# Patient Record
Sex: MALE | Race: WHITE | URBAN - METROPOLITAN AREA
[De-identification: names, ages, dates, MRNs, and addresses within clinical notes are randomized per-mention and may not be internally consistent; named-entity substitution may affect disease eponyms.]

---

## 2019-06-11 ENCOUNTER — HISTORICAL (OUTPATIENT)
Dept: ADMINISTRATIVE | Facility: HOSPITAL | Age: 64
End: 2019-06-11

## 2019-06-11 LAB
BNP BLD-MCNC: 70 PG/ML (ref 0–125)
BUN SERPL-MCNC: 18 MG/DL (ref 7–18)
CALCIUM SERPL-MCNC: 9.5 MG/DL (ref 8.5–10.1)
CHLORIDE SERPL-SCNC: 101 MMOL/L (ref 98–107)
CO2 SERPL-SCNC: 31 MMOL/L (ref 21–32)
CREAT SERPL-MCNC: 1.25 MG/DL (ref 0.7–1.3)
CREAT/UREA NIT SERPL: 14.4
GLUCOSE SERPL-MCNC: 92 MG/DL (ref 74–106)
POTASSIUM SERPL-SCNC: 4.5 MMOL/L (ref 3.5–5.1)
SODIUM SERPL-SCNC: 136 MMOL/L (ref 136–145)

## 2019-06-16 ENCOUNTER — HOSPITAL ENCOUNTER (OUTPATIENT)
Dept: MEDSURG UNIT | Facility: HOSPITAL | Age: 64
End: 2019-06-18
Attending: INTERNAL MEDICINE | Admitting: INTERNAL MEDICINE

## 2019-06-16 LAB
ABS NEUT (OLG): 4.28 X10(3)/MCL (ref 2.1–9.2)
ALBUMIN SERPL-MCNC: 3.9 GM/DL (ref 3.4–5)
ALBUMIN/GLOB SERPL: 1.2 RATIO (ref 1.1–2)
ALP SERPL-CCNC: 106 UNIT/L (ref 50–136)
ALT SERPL-CCNC: 35 UNIT/L (ref 12–78)
AST SERPL-CCNC: 24 UNIT/L (ref 15–37)
BASOPHILS # BLD AUTO: 0.1 X10(3)/MCL (ref 0–0.2)
BASOPHILS NFR BLD AUTO: 1 %
BILIRUB SERPL-MCNC: 0.7 MG/DL (ref 0.2–1)
BILIRUBIN DIRECT+TOT PNL SERPL-MCNC: 0.2 MG/DL (ref 0–0.5)
BILIRUBIN DIRECT+TOT PNL SERPL-MCNC: 0.5 MG/DL (ref 0–0.8)
BNP BLD-MCNC: 115 PG/ML (ref 0–100)
BUN SERPL-MCNC: 20 MG/DL (ref 7–18)
CALCIUM SERPL-MCNC: 9.5 MG/DL (ref 8.5–10.1)
CHLORIDE SERPL-SCNC: 105 MMOL/L (ref 98–107)
CO2 SERPL-SCNC: 30 MMOL/L (ref 21–32)
CREAT SERPL-MCNC: 1.42 MG/DL (ref 0.7–1.3)
EOSINOPHIL # BLD AUTO: 0.2 X10(3)/MCL (ref 0–0.9)
EOSINOPHIL NFR BLD AUTO: 2 %
ERYTHROCYTE [DISTWIDTH] IN BLOOD BY AUTOMATED COUNT: 11.9 % (ref 11.5–17)
GLOBULIN SER-MCNC: 3.3 GM/DL (ref 2.4–3.5)
GLUCOSE SERPL-MCNC: 99 MG/DL (ref 74–106)
HCT VFR BLD AUTO: 52.9 % (ref 42–52)
HGB BLD-MCNC: 17.6 GM/DL (ref 14–18)
INR PPP: 1.2 (ref 0–1.3)
LACTATE SERPL-SCNC: 1.8 MMOL/L (ref 0.4–2)
LACTATE SERPL-SCNC: 2.6 MMOL/L (ref 0.4–2)
LYMPHOCYTES # BLD AUTO: 4.7 X10(3)/MCL (ref 0.6–4.6)
LYMPHOCYTES NFR BLD AUTO: 44 %
MAGNESIUM SERPL-MCNC: 2.6 MG/DL (ref 1.8–2.4)
MCH RBC QN AUTO: 30.1 PG (ref 27–31)
MCHC RBC AUTO-ENTMCNC: 33.3 GM/DL (ref 33–36)
MCV RBC AUTO: 90.4 FL (ref 80–94)
MONOCYTES # BLD AUTO: 1.4 X10(3)/MCL (ref 0.1–1.3)
MONOCYTES NFR BLD AUTO: 13 %
NEUTROPHILS # BLD AUTO: 4.28 X10(3)/MCL (ref 2.1–9.2)
NEUTROPHILS NFR BLD AUTO: 40 %
PLATELET # BLD AUTO: 318 X10(3)/MCL (ref 130–400)
PMV BLD AUTO: 10.1 FL (ref 9.4–12.4)
POC TROPONIN: 0.05 NG/ML (ref 0–0.08)
POTASSIUM SERPL-SCNC: 3.8 MMOL/L (ref 3.5–5.1)
PROT SERPL-MCNC: 7.2 GM/DL (ref 6.4–8.2)
PROTHROMBIN TIME: 15.5 SECOND(S) (ref 12–14)
RBC # BLD AUTO: 5.85 X10(6)/MCL (ref 4.7–6.1)
SODIUM SERPL-SCNC: 141 MMOL/L (ref 136–145)
WBC # SPEC AUTO: 10.7 X10(3)/MCL (ref 4.5–11.5)

## 2019-06-17 LAB
TROPONIN I SERPL-MCNC: 0.04 NG/ML (ref 0.02–0.49)
TROPONIN I SERPL-MCNC: 0.05 NG/ML (ref 0.02–0.49)

## 2019-06-20 ENCOUNTER — HISTORICAL (OUTPATIENT)
Dept: CARDIAC REHAB | Facility: HOSPITAL | Age: 64
End: 2019-06-20

## 2019-06-21 ENCOUNTER — HISTORICAL (OUTPATIENT)
Dept: CARDIAC REHAB | Facility: HOSPITAL | Age: 64
End: 2019-06-21

## 2019-06-24 ENCOUNTER — HISTORICAL (OUTPATIENT)
Dept: CARDIAC REHAB | Facility: HOSPITAL | Age: 64
End: 2019-06-24

## 2019-06-26 ENCOUNTER — HISTORICAL (OUTPATIENT)
Dept: CARDIAC REHAB | Facility: HOSPITAL | Age: 64
End: 2019-06-26

## 2019-06-28 ENCOUNTER — HISTORICAL (OUTPATIENT)
Dept: CARDIAC REHAB | Facility: HOSPITAL | Age: 64
End: 2019-06-28

## 2019-07-01 ENCOUNTER — HISTORICAL (OUTPATIENT)
Dept: CARDIAC REHAB | Facility: HOSPITAL | Age: 64
End: 2019-07-01

## 2019-07-03 ENCOUNTER — HISTORICAL (OUTPATIENT)
Dept: CARDIAC REHAB | Facility: HOSPITAL | Age: 64
End: 2019-07-03

## 2019-07-05 ENCOUNTER — HISTORICAL (OUTPATIENT)
Dept: CARDIAC REHAB | Facility: HOSPITAL | Age: 64
End: 2019-07-05

## 2019-07-08 ENCOUNTER — HISTORICAL (OUTPATIENT)
Dept: CARDIAC REHAB | Facility: HOSPITAL | Age: 64
End: 2019-07-08

## 2019-07-09 ENCOUNTER — HISTORICAL (OUTPATIENT)
Dept: ADMINISTRATIVE | Facility: HOSPITAL | Age: 64
End: 2019-07-09

## 2019-07-09 LAB — DIGOXIN SERPL-MCNC: 1.3 NG/ML (ref 0.9–2)

## 2019-07-10 ENCOUNTER — HISTORICAL (OUTPATIENT)
Dept: CARDIAC REHAB | Facility: HOSPITAL | Age: 64
End: 2019-07-10

## 2019-07-12 ENCOUNTER — HISTORICAL (OUTPATIENT)
Dept: CARDIAC REHAB | Facility: HOSPITAL | Age: 64
End: 2019-07-12

## 2019-07-15 ENCOUNTER — HISTORICAL (OUTPATIENT)
Dept: CARDIAC REHAB | Facility: HOSPITAL | Age: 64
End: 2019-07-15

## 2019-07-17 ENCOUNTER — HISTORICAL (OUTPATIENT)
Dept: CARDIAC REHAB | Facility: HOSPITAL | Age: 64
End: 2019-07-17

## 2019-07-19 ENCOUNTER — HISTORICAL (OUTPATIENT)
Dept: CARDIAC REHAB | Facility: HOSPITAL | Age: 64
End: 2019-07-19

## 2019-07-22 ENCOUNTER — HISTORICAL (OUTPATIENT)
Dept: CARDIAC REHAB | Facility: HOSPITAL | Age: 64
End: 2019-07-22

## 2019-07-24 ENCOUNTER — HISTORICAL (OUTPATIENT)
Dept: CARDIAC REHAB | Facility: HOSPITAL | Age: 64
End: 2019-07-24

## 2019-07-26 ENCOUNTER — HISTORICAL (OUTPATIENT)
Dept: CARDIAC REHAB | Facility: HOSPITAL | Age: 64
End: 2019-07-26

## 2019-07-29 ENCOUNTER — HISTORICAL (OUTPATIENT)
Dept: CARDIAC REHAB | Facility: HOSPITAL | Age: 64
End: 2019-07-29

## 2019-07-31 ENCOUNTER — HISTORICAL (OUTPATIENT)
Dept: CARDIAC REHAB | Facility: HOSPITAL | Age: 64
End: 2019-07-31

## 2019-08-02 ENCOUNTER — HISTORICAL (OUTPATIENT)
Dept: CARDIAC REHAB | Facility: HOSPITAL | Age: 64
End: 2019-08-02

## 2019-08-05 ENCOUNTER — HISTORICAL (OUTPATIENT)
Dept: CARDIAC REHAB | Facility: HOSPITAL | Age: 64
End: 2019-08-05

## 2019-08-06 ENCOUNTER — HISTORICAL (OUTPATIENT)
Dept: ADMINISTRATIVE | Facility: HOSPITAL | Age: 64
End: 2019-08-06

## 2019-08-06 LAB — DIGOXIN SERPL-MCNC: 1.3 NG/ML (ref 0.9–2)

## 2019-08-07 ENCOUNTER — HISTORICAL (OUTPATIENT)
Dept: CARDIAC REHAB | Facility: HOSPITAL | Age: 64
End: 2019-08-07

## 2019-08-09 ENCOUNTER — HISTORICAL (OUTPATIENT)
Dept: CARDIAC REHAB | Facility: HOSPITAL | Age: 64
End: 2019-08-09

## 2019-08-12 ENCOUNTER — HISTORICAL (OUTPATIENT)
Dept: CARDIAC REHAB | Facility: HOSPITAL | Age: 64
End: 2019-08-12

## 2019-09-09 ENCOUNTER — HISTORICAL (OUTPATIENT)
Dept: INTENSIVE CARE | Facility: HOSPITAL | Age: 64
End: 2019-09-09

## 2019-10-02 ENCOUNTER — HISTORICAL (OUTPATIENT)
Dept: ADMINISTRATIVE | Facility: HOSPITAL | Age: 64
End: 2019-10-02

## 2019-11-19 ENCOUNTER — HISTORICAL (OUTPATIENT)
Dept: ADMINISTRATIVE | Facility: HOSPITAL | Age: 64
End: 2019-11-19

## 2019-11-19 LAB
ABS NEUT (OLG): 4.02 X10(3)/MCL (ref 2.1–9.2)
BASOPHILS # BLD AUTO: 0.1 X10(3)/MCL (ref 0–0.2)
BASOPHILS NFR BLD AUTO: 1 %
BUN SERPL-MCNC: 16 MG/DL (ref 7–18)
CALCIUM SERPL-MCNC: 9.1 MG/DL (ref 8.5–10.1)
CHLORIDE SERPL-SCNC: 104 MMOL/L (ref 98–107)
CO2 SERPL-SCNC: 29 MMOL/L (ref 21–32)
CREAT SERPL-MCNC: 1.15 MG/DL (ref 0.7–1.3)
CREAT/UREA NIT SERPL: 13.9
EOSINOPHIL # BLD AUTO: 0.2 X10(3)/MCL (ref 0–0.9)
EOSINOPHIL NFR BLD AUTO: 3 %
ERYTHROCYTE [DISTWIDTH] IN BLOOD BY AUTOMATED COUNT: 12.5 % (ref 11.5–17)
GLUCOSE SERPL-MCNC: 105 MG/DL (ref 74–106)
HCT VFR BLD AUTO: 51.8 % (ref 42–52)
HGB BLD-MCNC: 17.1 GM/DL (ref 14–18)
LYMPHOCYTES # BLD AUTO: 1.9 X10(3)/MCL (ref 0.6–4.6)
LYMPHOCYTES NFR BLD AUTO: 27 %
MCH RBC QN AUTO: 29.5 PG (ref 27–31)
MCHC RBC AUTO-ENTMCNC: 33 GM/DL (ref 33–36)
MCV RBC AUTO: 89.3 FL (ref 80–94)
MONOCYTES # BLD AUTO: 0.8 X10(3)/MCL (ref 0.1–1.3)
MONOCYTES NFR BLD AUTO: 11 %
NEUTROPHILS # BLD AUTO: 4.02 X10(3)/MCL (ref 2.1–9.2)
NEUTROPHILS NFR BLD AUTO: 58 %
PLATELET # BLD AUTO: 215 X10(3)/MCL (ref 130–400)
PMV BLD AUTO: 9.7 FL (ref 9.4–12.4)
POTASSIUM SERPL-SCNC: 4.6 MMOL/L (ref 3.5–5.1)
RBC # BLD AUTO: 5.8 X10(6)/MCL (ref 4.7–6.1)
SODIUM SERPL-SCNC: 138 MMOL/L (ref 136–145)
WBC # SPEC AUTO: 7 X10(3)/MCL (ref 4.5–11.5)

## 2022-04-10 ENCOUNTER — HISTORICAL (OUTPATIENT)
Dept: ADMINISTRATIVE | Facility: HOSPITAL | Age: 67
End: 2022-04-10

## 2022-04-28 VITALS
SYSTOLIC BLOOD PRESSURE: 120 MMHG | WEIGHT: 223 LBS | BODY MASS INDEX: 33.03 KG/M2 | HEIGHT: 69 IN | DIASTOLIC BLOOD PRESSURE: 88 MMHG

## 2022-04-30 NOTE — H&P
"  CHIEF COMPLAINT:  "Passed out."    HISTORY OF PRESENT ILLNESS:  The patient is a 64-year-old white male, well known to me.  The patient experienced a myocardial infarction 2019, thought to represent an embolic event associated with atrial fibrillation.  The patient had problems with hypertension during that hospital stay and required high-dose enalapril for control.  He states he was in his automobile on the way to [place].  His wife was driving.  She states he suddenly became unresponsive and slumped over.  She placed a nitroglycerin under the patient's tongue which he then spat out.  At the time of presentation to the ER, he was hypotensive.  He denied associated chest discomfort or shortness of breath and stated he has felt fine since that event.  EKG and cardiac enzymes were unremarkable.    PAST MEDICAL HISTORY:  Significant for recent non-ST-elevation myocardial infarction associated with rapid atrial fibrillation and 3 to 4+ mitral regurgitation.  He has also been treated for hyperlipidemia.    CURRENT MEDICATIONS:  Include:    1. Metoprolol ER 50 mg p.o. b.i.d.  2. Eliquis 5 mg p.o. b.i.d.  3. Aspirin 81 mg daily.  4. Atorvastatin 10 mg at bedtime.   5. Enalapril 20 mg b.i.d.  6. Hydrochlorothiazide 25 mg daily.    ALLERGIES:  HE REPORTS NO ALLERGIES.    SOCIAL HISTORY:  Negative for cigarette smoking or alcohol abuse.    FAMILY HISTORY:  Significant for longevity.  His mother  at 99 and his father at 87.    REVIEW OF SYSTEMS:  Negative for chest pain, palpitations, shortness of breath, dyspnea on exertion, paroxysmal nocturnal dyspnea, orthopnea, nausea, vomiting, diarrhea, constipation.  He reports a single syncopal event as described.  He denies weight loss, weight gain, fever, chills, night sweats, hematuria, hematemesis, hemoptysis, or hematochezia.    PHYSICAL EXAMINATION:  VITAL SIGNS:  His blood pressure is 117/80.  His pulse is 75 and irregular.   HEENT:  Unremarkable, with no carotid " bruits or jugular venous distention.   CARDIAC:  Reveals an irregularly irregular rhythm with no murmur.   CHEST:  Clear bilaterally with symmetrical excursions.   ABDOMEN:  Soft with normal bowel sounds.   EXTREMITIES:  Reveal normal pulses in the upper and lower extremities, with no peripheral cyanosis or edema.     He has 2 negative serum troponins.    IMPRESSION:    1. Hypotensive event with syncope.  2. Atrial fibrillation with a controlled ventricular response.  3. Recent myocardial infarction, thought to represent embolic event.    RECOMMENDATIONS:    1. Discontinue hydrochlorothiazide and enalapril.  2. Monitor rhythm and blood pressure overnight.    3. Probable discharge in a..        ______________________________  Joaquin Tai MD    JJLAKISHA/UW  DD:  06/17/2019  Time:  12:04PM  DT:  06/17/2019  Time:  12:19PM  Job #:  [id number]    The H&P was reviewed, the patient was examined, and the following changes to the patients condition are noted:  ______________________________________________________________________________  ______________________________________________________________________________  ______________________________________________________________________________  [  ] No changes to the patient's condition:      ______________________________                                             ___________________  PHYSICIAN SIGNATURE                                                             DATE/TIME

## 2022-04-30 NOTE — ED PROVIDER NOTES
"   Patient:   Gerry Lamb             MRN: [id number]            FIN: [id number]               Age:   64 years     Sex:  Male     :  1955   Associated Diagnoses:   Hypotension; Syncope   Author:   Sergio Peterson MD      Basic Information   Time seen: Date & time 2019 10:52:00.   History source: Patient, spouse.   Arrival mode: Private vehicle.   History limitation: None.   Additional information: Patient's physician(s): [place] MARCIAL, Joaquin GARZON      History of Present Illness   The patient presents with dizziness, This is a 64-year-old male who presents with dizziness and diaphoresis starting just prior to assessment with a history of an MI 2 weeks ago.  Patient is currently hypotensive at 59/45 at triage.  Currently in A. fib with controlled rate. and     I, Dr. Peterson, assumed care of this patient at 1055.    64 year old male with hx of a-fib, MI 10 days ago presents to the ED with wife [place] and he became quiet and slumped forward. Wife states she gave him a NTG as she was concerned it could have been his heart. States he was only unresponsive for 10 seconds or so.  Pt states he spit out. Denies any CP, abdominal pain, HA, dizziness. Pt hypotensive in triage 59/45.  MI was believed to be caused by his afib..  The onset was just prior to arrival.  The course/duration of symptoms is resolved.  The character of symptoms is "dizzy" per triage.  The degree at present is none.  The exacerbating factor is none.  The relieving factor is none.  Risk factors consist of a-fib. recent MI.  Prior episodes: none.  Therapy today: NTG.  Associated symptoms: syncope.  Additional history: none.        Review of Systems   Constitutional symptoms:  Negative except as documented in HPI.   Skin symptoms:  Negative except as documented in HPI.   Eye symptoms:  Negative except as documented in HPI.   ENMT symptoms:  Negative except as documented in HPI.   Respiratory symptoms:  " Negative except as documented in HPI.   Cardiovascular symptoms:  Syncope, diaphoresis, No chest pain,    Gastrointestinal symptoms:  No abdominal pain,    Musculoskeletal symptoms:  Negative except as documented in HPI.   Neurologic symptoms:  Dizziness.   Hematologic/Lymphatic symptoms:  Negative except as documented in HPI.             Additional review of systems information: All other systems reviewed and otherwise negative.      Health Status   Allergies:    Allergic Reactions (Selected)  No Known Medication Allergies.   Medications:  (Selected)   Documented Medications  Documented  Eliquis 5 mg oral tablet: 5 mg = 1 tab(s), Oral, BID, 0 Refill(s)  aspirin 81 mg oral tablet, CHEWABLE: 81 mg = 1 tab(s), Oral, Daily, 0 Refill(s)  atorvastatin 10 mg oral tablet: 10 mg = 1 tab(s), Oral, Daily, 0 Refill(s)  enalapril 10 mg oral tablet: 20 mg = 2 tab(s), Oral, BID, 0 Refill(s)  hydrochlorothiazide 25 mg oral tablet: 25 mg = 1 tab(s), Oral, Daily, 0 Refill(s)  metoprolol succinate 25 mg oral tablet, extended release: 50 mg = 2 tab(s), Oral, BID, 0 Refill(s)  nitroglycerin 0.4 mg sublingual TAB: 0.4 mg = 1 tab(s), SL, q5min, PRN PRN chest pain, 0 Refill(s).   Immunizations: Per nurse's notes.      Past Medical/ Family/ Social History   Medical history: MI, a-fib.   Surgical history: cardiac cath, vasectomy, hernia repair.   Family history: Not significant.   Social history:    Social & Psychosocial Habits    Alcohol  06/04/2019  Use: Never    Substance Use  06/04/2019  Use: Never    Tobacco  06/04/2019  Use: Never (less than 100 in l    Patient Wants Consult For Cessation Counseling No    06/05/2019  Use: Never (less than 100 in l    Patient Wants Consult For Cessation Counseling N/A.      Physical Examination               Vital Signs   Vital Signs   6/16/2019 10:59 CDT      Peripheral Pulse Rate     84 bpm                             Heart Rate Monitored      84 bpm                             Respiratory Rate           26 br/min  HI                             SpO2                      97 %                             Oxygen Therapy            Room air                             Systolic Blood Pressure   130 mmHg                             Diastolic Blood Pressure  85 mmHg                             Mean Arterial Pressure, Cuff              100 mmHg  .      No qualifying data available.   Basic Oxygen Information   6/16/2019 10:59 CDT      SpO2                      97 %                             Oxygen Therapy            Room air  .   General:  Alert, no acute distress.    Skin:  Warm, moist, pale.    Head:  Normocephalic, atraumatic.    Neck:  Supple, trachea midline, no JVD, no carotid bruit.    Eye:  Pupils are equal, round and reactive to light, extraocular movements are intact, normal conjunctiva, vision unchanged.    Ears, nose, mouth and throat:  Tympanic membranes clear, oral mucosa moist, no pharyngeal erythema or exudate.    Cardiovascular:  No murmur, Normal peripheral perfusion, irregularly irregular rhythm, diaphoretic.    Respiratory:  Lungs are clear to auscultation, breath sounds are equal.    Gastrointestinal:  Soft, Nontender, Non distended, Normal bowel sounds.    Back:  Nontender, Normal range of motion.    Musculoskeletal:  Normal ROM, normal strength, no tenderness, no swelling.    Neurological:  Alert and oriented to person, place, time, and situation, No focal neurological deficit observed, CN II-XII intact, normal sensory observed, normal motor observed, normal coordination observed, Speech: Normal.    Lymphatics:  No lymphadenopathy.   Psychiatric:  Cooperative, appropriate mood & affect.       Medical Decision Making   Documents reviewed:  Emergency department nurses' notes.   Orders  Launch Orders   Laboratory:  CMP (Order): Stat collect, 6/16/2019 10:55 CDT, Blood, Lab Collect, Print Label By Order Location, 6/16/2019 10:55 CDT  CBC w/ Auto Diff (Order): Stat collect, 6/16/2019 10:55 CDT,  Blood, Lab Collect, Print Label By Order Location, 6/16/2019 10:55 CDT  POC BNP iSTAT request (Order): Blood, Stat collect, 6/16/2019 10:55 CDT by Carlitos Jeff, Lab Collect, Print Label By Order Location  POC iSTAT ER Troponin request (Order): Blood, Stat collect, 6/16/2019 10:55 CDT by Carlitos Jeff, Lab Collect, Print Label By Order Location  Patient Care:  Saline Lock Insert (Order): 6/16/2019 10:55 CDT  Contact MD for order for Aspirin and NTG. (Order): 6/16/2019 10:55 CDT, Contact MD for order for Aspirin and NTG.  Pulse Oximetry (Order): 6/16/2019 10:55 CDT, Place on pulse oximetry.  Monitor oxygen saturation.  Cardiac Monitoring (Order): 6/16/2019 10:55 CDT, Constant Order, Place on telemetry.  Blood Pressure (Order): 6/16/2019 10:55 CDT, Monitor blood pressure.  Pharmacy:  IVF Normal Saline NS Infusion 1,000 mL (Order): 1,000 mL, 1,000 mL, IV, 1,000 mL/hr, start date 6/16/2019 10:55 CDT  Radiology:  XR Chest 1 View (Order): Stat, 6/16/2019 10:55 CDT, Chest Pain, None, Portable, Rad Type, Not Scheduled  Cardiology:  EKG Adult 12 Lead (Order): 6/16/2019 10:55 CDT, Stat, Chest Pain, Show to provider upon completion., -1, -1, 6/16/2019 10:55 CDT  Respiratory Therapy:  Oxygen Therapy (Order): 6/16/2019 10:55 CDT, Nasal Cannula, Maintain O2 saturation > 93%., CM Oxygen.   Electrocardiogram:  Time 6/16/2019 10:54:00, rate 71, No ST-T changes, no ectopy, EP Interp, The Rhythm is atrial fibrillation.  , QRS interval Q waves in septal leads.    Results review:  Lab results : Lab View   6/16/2019 12:01 CDT      POC BNP iSTAT             115 pg/mL  HI    6/16/2019 11:59 CDT      POC Troponin              0.05 ng/mL    6/16/2019 11:01 CDT      Sodium Lvl                141 mmol/L                             Potassium Lvl             3.8 mmol/L                             Chloride                  105 mmol/L                             CO2                       30.0 mmol/L                              Calcium Lvl               9.5 mg/dL                             Magnesium Lvl             2.6 mg/dL  HI                             Glucose Lvl               99 mg/dL                             BUN                       20.0 mg/dL  HI                             Creatinine                1.42 mg/dL  HI                             eGFR-AA                   >60 mL/min/1.73 m2  NA                             eGFR-RUMA                  53 mL/min/1.73 m2  NA                             Bili Total                0.7 mg/dL                             Bili Direct               0.20 mg/dL                             Bili Indirect             0.50 mg/dL                             AST                       24 unit/L                             ALT                       35 unit/L                             Alk Phos                  106 unit/L                             Total Protein             7.2 gm/dL                             Albumin Lvl               3.90 gm/dL                             Globulin                  3.30 gm/dL                             A/G Ratio                 1.2 ratio                             PT                        15.5 second(s)  HI                             INR                       1.2                             WBC                       10.7 x10(3)/mcL                             RBC                       5.85 x10(6)/mcL                             Hgb                       17.6 gm/dL                             Hct                       52.9 %  HI                             Platelet                  318 x10(3)/mcL                             MCV                       90.4 fL                             MCH                       30.1 pg                             MCHC                      33.3 gm/dL                             RDW                       11.9 %                             MPV                       10.1 fL                             Abs Neut                   4.28 x10(3)/mcL                             Neutro Auto               40 %  NA                             Lymph Auto                44 %  NA                             Mono Auto                 13 %  NA                             Eos Auto                  2 %  NA                             Abs Eos                   0.2 x10(3)/mcL                             Basophil Auto             1 %  NA                             Abs Neutro                4.28 x10(3)/mcL                             Abs Lymph                 4.7 x10(3)/mcL  HI                             Abs Mono                  1.4 x10(3)/mcL  HI                             Abs Baso                  0.1 x10(3)/mcL  ,    No qualifying data available.    Radiology results:  Rad Results (ST)  < 12 hrs   Accession: [id number]  Order: XR Chest 1 View  Report Dt/Tm: 06/16/2019 12:18  Report:   PORTABLE CHEST X-RAY, ONE FRONTAL VIEW     HISTORY: Chest Pain     COMPARISON: 6/4/2019     FINDINGS:       No focal consolidations, pleural effusions or pneumothoraces.  Cardiomediastinal silhouette within normal limits.  No acute bony pathology.  Soft tissues within normal limits.       IMPRESSION:     No acute thoracic abnormality.  No interval change.    .      Reexamination/ Reevaluation   Vital signs   results included from flowsheet : Vital Signs   6/16/2019 13:20 CDT      Peripheral Pulse Rate     74 bpm                             Heart Rate Monitored      70 bpm                             Respiratory Rate          14 br/min                             SpO2                      97 %                             Oxygen Therapy            Room air                             Systolic Blood Pressure   108 mmHg                             Diastolic Blood Pressure  83 mmHg                             Mean Arterial Pressure, Cuff              91 mmHg    6/16/2019 13:10 CDT      Peripheral Pulse Rate     75 bpm                             Heart Rate Monitored      74  bpm                             Respiratory Rate          22 br/min                             SpO2                      96 %                             Oxygen Therapy            Room air                             Systolic Blood Pressure   107 mmHg                             Diastolic Blood Pressure  87 mmHg                             Mean Arterial Pressure, Cuff              94 mmHg    6/16/2019 13:00 CDT      Peripheral Pulse Rate     70 bpm                             Heart Rate Monitored      67 bpm                             Respiratory Rate          20 br/min                             SpO2                      94 %                             Oxygen Therapy            Room air                             Systolic Blood Pressure   109 mmHg                             Diastolic Blood Pressure  72 mmHg                             Mean Arterial Pressure, Cuff              84 mmHg    6/16/2019 12:50 CDT      Peripheral Pulse Rate     76 bpm                             Heart Rate Monitored      79 bpm                             Respiratory Rate          11 br/min  LOW                             SpO2                      98 %                             Oxygen Therapy            Room air                             Systolic Blood Pressure   108 mmHg                             Diastolic Blood Pressure  79 mmHg                             Mean Arterial Pressure, Cuff              89 mmHg    6/16/2019 12:40 CDT      Peripheral Pulse Rate     74 bpm                             Heart Rate Monitored      67 bpm                             Respiratory Rate          12 br/min                             SpO2                      95 %                             Oxygen Therapy            Room air                             Systolic Blood Pressure   104 mmHg                             Diastolic Blood Pressure  79 mmHg                             Mean Arterial Pressure, Cuff              87 mmHg     Patient with  hypotensive episode that did resolve with fluids.  [place] better in the ED, never did experience any chest pain.  Certainly nitroglycerin that he spit out probably didn't help and he was syncopal in the car.  Regardless he was syncopal before the nitroglycerin, so not sure if this is medication induced or another etiology.  We'll admit, finish ruling out.      Impression and Plan   Diagnosis   Hypotension (NLH94-DN I95.9)   Syncope (NDR76-EL R55)      Calls-Consults   -  Magda with Dr. Schofield.   Plan   Condition: Stable.    Disposition: Admit time  6/16/2019 13:46:00, Place in Observation Telemetry Unit, [place] MARCIAL, Joaquin GARZON    Counseled: Patient, Family, Regarding diagnosis, Regarding diagnostic results, Regarding treatment plan, Patient indicated understanding of instructions, Family at bedside understood.    Notes: I, Moshe Birmingham, acted solely as a scribe for and in the presence of Dr. Peterson who performed the service., I  personally performed all of the above services as recorded in this chart.

## 2022-04-30 NOTE — DISCHARGE SUMMARY
DISCHARGE DATE:      DISCHARGE DIAGNOSES:    1. Syncope.  2. Hypotensive event.  3. Atrial fibrillation with a controlled ventricular response.  4. Recent non-ST elevation myocardial infarction.    DISCHARGE MEDICATIONS:    1. Eliquis 5 mg p.o. b.i.d.  2. Aspirin 81 mg daily.  3. Atorvastatin 10 mg daily.  4. Metoprolol ER 50 mg p.o. b.i.d.    SUMMARY:  The patient is a 64-year-old male admitted through the emergency room after experiencing a syncopal event.  The patient had no associated chest pain or shortness of breath and remained asymptomatic during this hospital stay.  His enalapril and hydrochlorothiazide were discontinued, and he remained normotensive during his hospital stay.  He had no significant pauses on telemetry with atrial fibrillation and a controlled ventricular response.     He is discharged to home and will follow up early next week in cardiac rehab.  He will be seen in my office as previously scheduled.        ______________________________  MD ESTER Thomas/VIKA  DD:  06/18/2019  Time:  10:18AM  DT:  06/18/2019  Time:  10:36AM  Job #:  [id number]